# Patient Record
Sex: FEMALE | Race: WHITE | URBAN - METROPOLITAN AREA
[De-identification: names, ages, dates, MRNs, and addresses within clinical notes are randomized per-mention and may not be internally consistent; named-entity substitution may affect disease eponyms.]

---

## 2023-01-17 PROBLEM — 35298007: Status: ACTIVE | Noted: 2023-01-17

## 2023-01-23 ENCOUNTER — CLAIMS CREATED FROM THE CLAIM WINDOW (OUTPATIENT)
Dept: URBAN - METROPOLITAN AREA MEDICAL CENTER 9 | Facility: MEDICAL CENTER | Age: 58
End: 2023-01-23

## 2023-01-23 ENCOUNTER — CLAIMS CREATED FROM THE CLAIM WINDOW (OUTPATIENT)
Dept: URBAN - METROPOLITAN AREA MEDICAL CENTER 9 | Facility: MEDICAL CENTER | Age: 58
End: 2023-01-23
Payer: COMMERCIAL

## 2023-01-23 DIAGNOSIS — K56.2 VOLVULUS: ICD-10-CM

## 2023-01-23 DIAGNOSIS — K59.09 CHANGE IN BOWEL MOVEMENTS INTERMITTENT CONSTIPATION. URGENCY IN THE MORNING.: ICD-10-CM

## 2023-01-23 DIAGNOSIS — K63.89 OTHER SPECIFIED DISEASES OF INTESTINE: ICD-10-CM

## 2023-01-23 DIAGNOSIS — K59.00 CONSTIPATION, UNSPECIFIED: ICD-10-CM

## 2023-01-23 DIAGNOSIS — R93.5 ABNORMAL FINDINGS ON DIAGNOSTIC IMAGING OF OTHER ABDOMINAL REGIONS, INCLUDING RETROPERITONEUM: ICD-10-CM

## 2023-01-23 DIAGNOSIS — K80.20 ASYMPTOMATIC CHOLELITHIASIS: ICD-10-CM

## 2023-01-23 DIAGNOSIS — R93.3 ABN FINDINGS-GI TRACT: ICD-10-CM

## 2023-01-23 PROCEDURE — 99254 IP/OBS CNSLTJ NEW/EST MOD 60: CPT | Performed by: PHYSICIAN ASSISTANT

## 2023-01-23 PROCEDURE — 45378 DIAGNOSTIC COLONOSCOPY: CPT | Performed by: INTERNAL MEDICINE

## 2023-01-23 PROCEDURE — 99222 1ST HOSP IP/OBS MODERATE 55: CPT | Performed by: INTERNAL MEDICINE

## 2023-01-23 PROCEDURE — G8427 DOCREV CUR MEDS BY ELIG CLIN: HCPCS | Performed by: INTERNAL MEDICINE

## 2023-01-23 PROCEDURE — 99254 IP/OBS CNSLTJ NEW/EST MOD 60: CPT | Performed by: INTERNAL MEDICINE

## 2023-01-24 ENCOUNTER — CLAIMS CREATED FROM THE CLAIM WINDOW (OUTPATIENT)
Dept: URBAN - METROPOLITAN AREA MEDICAL CENTER 9 | Facility: MEDICAL CENTER | Age: 58
End: 2023-01-24

## 2023-01-24 ENCOUNTER — CLAIMS CREATED FROM THE CLAIM WINDOW (OUTPATIENT)
Dept: URBAN - METROPOLITAN AREA MEDICAL CENTER 9 | Facility: MEDICAL CENTER | Age: 58
End: 2023-01-24
Payer: COMMERCIAL

## 2023-01-24 DIAGNOSIS — R10.32 ABDOMINAL CRAMPING IN LEFT LOWER QUADRANT: ICD-10-CM

## 2023-01-24 PROCEDURE — 45378 DIAGNOSTIC COLONOSCOPY: CPT | Performed by: INTERNAL MEDICINE

## 2023-01-27 ENCOUNTER — OFFICE VISIT (OUTPATIENT)
Dept: URBAN - METROPOLITAN AREA CLINIC 74 | Facility: CLINIC | Age: 58
End: 2023-01-27

## 2023-01-27 NOTE — HPI-TODAY'S VISIT:
Desire Schumacher is a 57 y.o. yo female who was admitted on 1/22/2023 for Sigmoid volvulus (HCC). 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 S/P back surgery in 2022, Cervical disc disease, S/P Neck surgery, Hysterectomy, Leiomyoma of Uterus, SONIDO on CPAP, Anxiety, and Obesity admitted to  for constipation and suspected  Sigmoid volvulus on CT imaging. Labs. imaging, and procedure as noted below.    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 There are now large areas consolidation at the Lung bases suggestive of pneumonia, and persistent centrilobular nodules suggesting endobronchial infection Large bowel is moderately distended until abruptly collapses at area of swirling the sigmoid colon and this is suggestive of an early sigmoid volvulus.   -- Labs on 12/22/2023: CBC, CMP, and COVID negative.   Procedure: -- Colonoscopy on 01/24/2022 by Dr. Jeffery noted Diverticulosis in the sigmoid colon. Internal hemorrhoids. No specimens collected. Recommendation: Dr. Alonzo Falcon in the room during the procedure to witness the very redundant colon. No overt volvulus or torsion today.  The patientmay benefit from outptatient elective sigmoid colectomy.  -- Colonoscopy on 11/10/2016 by Dr. Gonsalez noted the entire examined colon is normal.  No specimen collected.  Repeat colonoscopy in 10 years interval.

## 2023-01-27 NOTE — PHYSICAL EXAM SKIN:
no rashes , no suspicious lesions , no areas of discoloration , no jaundice present , good turgor , no masses , no tenderness on palpation
None

## 2023-04-28 ENCOUNTER — P2P PATIENT RECORD (OUTPATIENT)
Age: 58
End: 2023-04-28

## 2023-05-03 ENCOUNTER — OFFICE VISIT (OUTPATIENT)
Dept: URBAN - METROPOLITAN AREA CLINIC 40 | Facility: CLINIC | Age: 58
End: 2023-05-03
Payer: COMMERCIAL

## 2023-05-03 ENCOUNTER — WEB ENCOUNTER (OUTPATIENT)
Dept: URBAN - METROPOLITAN AREA CLINIC 40 | Facility: CLINIC | Age: 58
End: 2023-05-03

## 2023-05-03 VITALS
DIASTOLIC BLOOD PRESSURE: 86 MMHG | SYSTOLIC BLOOD PRESSURE: 150 MMHG | WEIGHT: 176 LBS | HEIGHT: 62 IN | HEART RATE: 79 BPM | BODY MASS INDEX: 32.39 KG/M2

## 2023-05-03 DIAGNOSIS — Q89.3 SITUS INVERSUS: ICD-10-CM

## 2023-05-03 DIAGNOSIS — K59.09 CHRONIC CONSTIPATION: ICD-10-CM

## 2023-05-03 DIAGNOSIS — Q43.8 TORTUOUS COLON: ICD-10-CM

## 2023-05-03 DIAGNOSIS — K80.20 CHOLELITHIASIS: ICD-10-CM

## 2023-05-03 DIAGNOSIS — R93.5 ABNORMAL ABDOMINAL CT SCAN: ICD-10-CM

## 2023-05-03 DIAGNOSIS — K64.8 INTERNAL HEMORRHOIDS: ICD-10-CM

## 2023-05-03 PROCEDURE — 99214 OFFICE O/P EST MOD 30 MIN: CPT | Performed by: PHYSICIAN ASSISTANT

## 2023-05-03 NOTE — HPI-TODAY'S VISIT:
Ms. Schumacher is a 58-year-old white female who presents to the office today for follow-up of colonoscopy for suspected volvulus back in January.  Dr. Jeffery did complete a colonoscopy January 24, 2023 after a CT abdomen and pelvis suggested possible sigmoid colon volvulus.  The large bowel at that time moderately distended.  No evidence of small bowel obstruction and the appendix was normal in the left lower quadrant per dictation.  Repeat CT abdomen and pelvis in April 21, 2023 noted less dramatic twisting at the sigmoid colon with relative luminal attenuation.  Proximal colon to this level showed mild fluid and gaseous distention.  The rectum demonstrated gas with mild distention and stool.  No significant proctitis.  Nonspecific mild edema at the level of the proximal colon with nonvisualized appendix.  With colonoscopy back in January, Dr. Jeffery did have surgeon Dr. Alonzo Falcon with him in the room to witness the very redundant colon.  There was no overt volvulus or torsion noted.  It was recommended the patient consider elective sigmoid colectomy and MiraLAX daily.  Patient also has history of suspected acute cholecystitis and does have follow-up with general surgery tomorrow to plan for elective cholecystectomy.  Back in 2016,  She had a normal colonoscopy by Dr. Gonsalez. She presents to the office today stating that overall her abdominal pain has improved significantly.  No nausea, vomiting or dysphagia.  Good appetite.  Denies any rectal bleeding.  She does have a visit scheduled for tomorrow with Dr. Falcon to discuss cholecystectomy and to review CT abdomen and pelvis from April, to discuss elective partial colectomy.  She does admit some hesitancy to have surgery that would place her out of work for extended period of time as she has had elective surgeries in 2021 in 2022 also.  Caregiver.  Otherwise no complaints.

## 2023-05-04 PROBLEM — 10331000132107: Status: ACTIVE | Noted: 2023-05-04

## 2023-06-12 ENCOUNTER — CLAIMS CREATED FROM THE CLAIM WINDOW (OUTPATIENT)
Dept: URBAN - METROPOLITAN AREA MEDICAL CENTER 9 | Facility: MEDICAL CENTER | Age: 58
End: 2023-06-12
Payer: COMMERCIAL

## 2023-06-12 ENCOUNTER — CLAIMS CREATED FROM THE CLAIM WINDOW (OUTPATIENT)
Dept: URBAN - METROPOLITAN AREA MEDICAL CENTER 9 | Facility: MEDICAL CENTER | Age: 58
End: 2023-06-12

## 2023-06-12 DIAGNOSIS — K80.20 CALCULUS OF GALLBLADDER WITHOUT CHOLECYSTITIS WITHOUT OBSTRUCTION: ICD-10-CM

## 2023-06-12 DIAGNOSIS — K56.2 VOLVULUS: ICD-10-CM

## 2023-06-12 DIAGNOSIS — Z12.11 COLON CANCER SCREENING: ICD-10-CM

## 2023-06-12 DIAGNOSIS — R93.3 ABNORMAL FINDINGS ON DIAGNOSTIC IMAGING OF OTHER PARTS OF DIGESTIVE TRACT: ICD-10-CM

## 2023-06-12 DIAGNOSIS — R10.32 LEFT LOWER QUADRANT PAIN: ICD-10-CM

## 2023-06-12 PROCEDURE — 45337 SIGMOIDOSCOPY & DECOMPRESS: CPT | Performed by: INTERNAL MEDICINE

## 2023-06-12 PROCEDURE — G8427 DOCREV CUR MEDS BY ELIG CLIN: HCPCS | Performed by: INTERNAL MEDICINE

## 2023-06-12 PROCEDURE — 992 APS NON BILLABLE: Performed by: INTERNAL MEDICINE

## 2023-06-12 PROCEDURE — 99221 1ST HOSP IP/OBS SF/LOW 40: CPT | Performed by: INTERNAL MEDICINE

## 2023-06-12 PROCEDURE — 99253 IP/OBS CNSLTJ NEW/EST LOW 45: CPT | Performed by: INTERNAL MEDICINE

## 2023-06-13 ENCOUNTER — CLAIMS CREATED FROM THE CLAIM WINDOW (OUTPATIENT)
Dept: URBAN - METROPOLITAN AREA MEDICAL CENTER 9 | Facility: MEDICAL CENTER | Age: 58
End: 2023-06-13
Payer: COMMERCIAL

## 2023-06-13 DIAGNOSIS — K80.20 CALCULUS OF GALLBLADDER WITHOUT CHOLECYSTITIS WITHOUT OBSTRUCTION: ICD-10-CM

## 2023-06-13 DIAGNOSIS — R10.32 LEFT LOWER QUADRANT PAIN: ICD-10-CM

## 2023-06-13 DIAGNOSIS — R93.3 ABNORMAL FINDINGS ON DIAGNOSTIC IMAGING OF OTHER PARTS OF DIGESTIVE TRACT: ICD-10-CM

## 2023-06-13 DIAGNOSIS — K56.2 VOLVULUS: ICD-10-CM

## 2023-06-13 PROCEDURE — 99231 SBSQ HOSP IP/OBS SF/LOW 25: CPT | Performed by: PHYSICIAN ASSISTANT

## 2023-06-13 PROCEDURE — 99231 SBSQ HOSP IP/OBS SF/LOW 25: CPT | Performed by: INTERNAL MEDICINE

## 2023-06-13 PROCEDURE — 99253 IP/OBS CNSLTJ NEW/EST LOW 45: CPT | Performed by: INTERNAL MEDICINE

## 2023-06-13 PROCEDURE — 99253 IP/OBS CNSLTJ NEW/EST LOW 45: CPT | Performed by: PHYSICIAN ASSISTANT

## 2023-07-10 ENCOUNTER — OFFICE VISIT (OUTPATIENT)
Dept: URBAN - METROPOLITAN AREA SURGERY CENTER 30 | Facility: SURGERY CENTER | Age: 58
End: 2023-07-10

## 2023-09-05 ENCOUNTER — DASHBOARD ENCOUNTERS (OUTPATIENT)
Age: 58
End: 2023-09-05

## 2023-09-05 ENCOUNTER — OFFICE VISIT (OUTPATIENT)
Dept: URBAN - METROPOLITAN AREA CLINIC 40 | Facility: CLINIC | Age: 58
End: 2023-09-05
Payer: COMMERCIAL

## 2023-09-05 VITALS
SYSTOLIC BLOOD PRESSURE: 124 MMHG | BODY MASS INDEX: 31.1 KG/M2 | HEART RATE: 74 BPM | TEMPERATURE: 97.5 F | DIASTOLIC BLOOD PRESSURE: 76 MMHG | HEIGHT: 62 IN | WEIGHT: 169 LBS

## 2023-09-05 DIAGNOSIS — K59.09 CHRONIC CONSTIPATION: ICD-10-CM

## 2023-09-05 DIAGNOSIS — R93.5 ABNORMAL ABDOMINAL CT SCAN: ICD-10-CM

## 2023-09-05 DIAGNOSIS — Q43.8 TORTUOUS COLON: ICD-10-CM

## 2023-09-05 DIAGNOSIS — K80.20 CHOLELITHIASIS: ICD-10-CM

## 2023-09-05 PROCEDURE — 99214 OFFICE O/P EST MOD 30 MIN: CPT | Performed by: PHYSICIAN ASSISTANT

## 2023-09-05 RX ORDER — DICYCLOMINE HYDROCHLORIDE 20 MG/1
1 TABLET TABLET ORAL
Qty: 60 TABLET | Refills: 1 | OUTPATIENT
Start: 2023-09-05 | End: 2023-11-04

## 2023-09-05 NOTE — PHYSICAL EXAM GASTROINTESTINAL
Abdomen , soft, nontender, nondistended , no guarding or rigidity , no masses palpable , normal bowel sounds , Liver and Spleen,  no hepatosplenomegaly , liver nontender, well healed surgical scar in midline

## 2023-09-05 NOTE — HPI-TODAY'S VISIT:
Ms. Schumacher is a 58-year-old white female who presents to the office today for follow-up of colonoscopy for suspected volvulus back in January.  Dr. Jeffery did complete a colonoscopy January 24, 2023 after a CT abdomen and pelvis suggested possible sigmoid colon volvulus.  The large bowel at that time moderately distended.  No evidence of small bowel obstruction and the appendix was normal in the left lower quadrant per dictation.  Repeat CT abdomen and pelvis in April 21, 2023 noted less dramatic twisting at the sigmoid colon with relative luminal attenuation.  Proximal colon to this level showed mild fluid and gaseous distention.  The rectum demonstrated gas with mild distention and stool.  No significant proctitis.  Nonspecific mild edema at the level of the proximal colon with nonvisualized appendix.  With colonoscopy back in January, Dr. Jeffery did have surgeon Dr. Alonzo Falcon with him in the room to witness the very redundant colon.  There was no overt volvulus or torsion noted.  It was recommended the patient consider elective sigmoid colectomy and MiraLAX daily.  Patient also had suspected acute cholecystitis. Back in 2016,  She had a normal colonoscopy by Dr. Gonsalez. Given history of volvulus, on Lakshmi 15, 2023, Dr. Falcon did perform sigmoid colectomy as well as laparoscopic cholecystectomy.  Since that time, she has continued to have intermittent and sometimes severe, sharp left upper quadrant abdominal pain from site of cholecystectomy.  No nausea, vomiting or fever reported.  No other change in bowel habits.  She has been taking occasional NSAIDs for this.  But no report of any GI bleeding.  She is scheduled for CT abdomen next week to be a review by Dr. Falcon.

## 2023-11-09 ENCOUNTER — OFFICE VISIT (OUTPATIENT)
Dept: URBAN - METROPOLITAN AREA CLINIC 40 | Facility: CLINIC | Age: 58
End: 2023-11-09